# Patient Record
Sex: MALE | URBAN - METROPOLITAN AREA
[De-identification: names, ages, dates, MRNs, and addresses within clinical notes are randomized per-mention and may not be internally consistent; named-entity substitution may affect disease eponyms.]

---

## 2022-12-27 ENCOUNTER — OFFICE (OUTPATIENT)
Dept: URBAN - METROPOLITAN AREA CLINIC 6 | Facility: CLINIC | Age: 77
Setting detail: OPHTHALMOLOGY
End: 2022-12-27
Payer: COMMERCIAL

## 2022-12-27 DIAGNOSIS — E11.3293: ICD-10-CM

## 2022-12-27 DIAGNOSIS — H25.12: ICD-10-CM

## 2022-12-27 DIAGNOSIS — H52.4: ICD-10-CM

## 2022-12-27 DIAGNOSIS — H02.403: ICD-10-CM

## 2022-12-27 PROCEDURE — 92004 COMPRE OPH EXAM NEW PT 1/>: CPT | Performed by: OPHTHALMOLOGY

## 2022-12-27 PROCEDURE — 92015 DETERMINE REFRACTIVE STATE: CPT | Performed by: OPHTHALMOLOGY

## 2022-12-27 ASSESSMENT — KERATOMETRY
OS_AXISANGLE_DEGREES: 025
OD_K2POWER_DIOPTERS: 46.00
OS_K2POWER_DIOPTERS: 45.75
OD_K1POWER_DIOPTERS: 45.25
METHOD_AUTO_MANUAL: AUTO
OD_AXISANGLE_DEGREES: 155
OS_K1POWER_DIOPTERS: 45.00

## 2022-12-27 ASSESSMENT — REFRACTION_MANIFEST
OD_AXIS: 080
OS_SPHERE: +1.25
OD_VA1: 20/20
OS_AXIS: 115
OS_AXIS: 115
OS_CYLINDER: -1.75
OU_VA: 20/30-2
OS_CYLINDER: -1.75
OD_SPHERE: +0.75
OD_CYLINDER: -1.25
OD_CYLINDER: -1.25
OD_AXIS: 080
OS_VA1: 20/40
OS_VA1: 20/40
OD_ADD: +2.50
OD_SPHERE: +0.75
OS_ADD: +2.50
OU_VA: 20/30-2
OS_SPHERE: +1.25
OD_VA1: 20/20

## 2022-12-27 ASSESSMENT — REFRACTION_AUTOREFRACTION
OD_CYLINDER: -1.25
OD_SPHERE: +0.75
OS_AXIS: 115
OD_AXIS: 080
OS_SPHERE: +1.25
OS_CYLINDER: -1.75

## 2022-12-27 ASSESSMENT — AXIALLENGTH_DERIVED
OD_AL: 22.7908
OS_AL: 22.7856
OD_AL: 22.7908
OS_AL: 22.7856
OS_AL: 22.7856
OD_AL: 22.7908

## 2022-12-27 ASSESSMENT — CONFRONTATIONAL VISUAL FIELD TEST (CVF)
OS_FINDINGS: FULL
OD_FINDINGS: FULL

## 2022-12-27 ASSESSMENT — SPHEQUIV_DERIVED
OD_SPHEQUIV: 0.125
OD_SPHEQUIV: 0.125
OS_SPHEQUIV: 0.375
OD_SPHEQUIV: 0.125

## 2022-12-27 ASSESSMENT — LID POSITION - PTOSIS
OS_PTOSIS: T 1+
OD_PTOSIS: T 1+

## 2022-12-27 ASSESSMENT — TONOMETRY
OS_IOP_MMHG: 11
OD_IOP_MMHG: 14

## 2022-12-27 ASSESSMENT — VISUAL ACUITY
OS_BCVA: 20/25-2
OD_BCVA: 20/50-1

## 2023-10-15 ENCOUNTER — INPATIENT (INPATIENT)
Facility: HOSPITAL | Age: 78
LOS: 0 days | Discharge: LEFT BEFORE DISCHARGE | DRG: 65 | End: 2023-10-16
Attending: STUDENT IN AN ORGANIZED HEALTH CARE EDUCATION/TRAINING PROGRAM | Admitting: HOSPITALIST
Payer: MEDICARE

## 2023-10-15 VITALS
HEART RATE: 50 BPM | HEIGHT: 66 IN | SYSTOLIC BLOOD PRESSURE: 120 MMHG | DIASTOLIC BLOOD PRESSURE: 56 MMHG | RESPIRATION RATE: 18 BRPM | TEMPERATURE: 98 F | OXYGEN SATURATION: 96 %

## 2023-10-15 DIAGNOSIS — R47.01 APHASIA: ICD-10-CM

## 2023-10-15 LAB
ALBUMIN SERPL ELPH-MCNC: 4.4 G/DL — SIGNIFICANT CHANGE UP (ref 3.3–5.2)
ALP SERPL-CCNC: 58 U/L — SIGNIFICANT CHANGE UP (ref 40–120)
ALT FLD-CCNC: 18 U/L — SIGNIFICANT CHANGE UP
ANION GAP SERPL CALC-SCNC: 11 MMOL/L — SIGNIFICANT CHANGE UP (ref 5–17)
APPEARANCE UR: CLEAR — SIGNIFICANT CHANGE UP
APTT BLD: 37.6 SEC — HIGH (ref 24.5–35.6)
AST SERPL-CCNC: 28 U/L — SIGNIFICANT CHANGE UP
BASOPHILS # BLD AUTO: 0.02 K/UL — SIGNIFICANT CHANGE UP (ref 0–0.2)
BASOPHILS NFR BLD AUTO: 0.4 % — SIGNIFICANT CHANGE UP (ref 0–2)
BILIRUB SERPL-MCNC: 0.3 MG/DL — LOW (ref 0.4–2)
BILIRUB UR-MCNC: NEGATIVE — SIGNIFICANT CHANGE UP
BUN SERPL-MCNC: 37.7 MG/DL — HIGH (ref 8–20)
CALCIUM SERPL-MCNC: 9.6 MG/DL — SIGNIFICANT CHANGE UP (ref 8.4–10.5)
CHLORIDE SERPL-SCNC: 102 MMOL/L — SIGNIFICANT CHANGE UP (ref 96–108)
CO2 SERPL-SCNC: 24 MMOL/L — SIGNIFICANT CHANGE UP (ref 22–29)
COLOR SPEC: YELLOW — SIGNIFICANT CHANGE UP
CREAT SERPL-MCNC: 2.29 MG/DL — HIGH (ref 0.5–1.3)
DIFF PNL FLD: NEGATIVE — SIGNIFICANT CHANGE UP
EGFR: 29 ML/MIN/1.73M2 — LOW
EOSINOPHIL # BLD AUTO: 0.34 K/UL — SIGNIFICANT CHANGE UP (ref 0–0.5)
EOSINOPHIL NFR BLD AUTO: 7 % — HIGH (ref 0–6)
GLUCOSE BLDC GLUCOMTR-MCNC: 94 MG/DL — SIGNIFICANT CHANGE UP (ref 70–99)
GLUCOSE SERPL-MCNC: 113 MG/DL — HIGH (ref 70–99)
GLUCOSE UR QL: NEGATIVE MG/DL — SIGNIFICANT CHANGE UP
HCT VFR BLD CALC: 33.4 % — LOW (ref 39–50)
HGB BLD-MCNC: 10.8 G/DL — LOW (ref 13–17)
IMM GRANULOCYTES NFR BLD AUTO: 0.2 % — SIGNIFICANT CHANGE UP (ref 0–0.9)
INR BLD: 1.14 RATIO — SIGNIFICANT CHANGE UP (ref 0.85–1.18)
KETONES UR-MCNC: NEGATIVE — SIGNIFICANT CHANGE UP
LEUKOCYTE ESTERASE UR-ACNC: NEGATIVE — SIGNIFICANT CHANGE UP
LYMPHOCYTES # BLD AUTO: 1.21 K/UL — SIGNIFICANT CHANGE UP (ref 1–3.3)
LYMPHOCYTES # BLD AUTO: 24.7 % — SIGNIFICANT CHANGE UP (ref 13–44)
MCHC RBC-ENTMCNC: 31.1 PG — SIGNIFICANT CHANGE UP (ref 27–34)
MCHC RBC-ENTMCNC: 32.3 GM/DL — SIGNIFICANT CHANGE UP (ref 32–36)
MCV RBC AUTO: 96.3 FL — SIGNIFICANT CHANGE UP (ref 80–100)
MONOCYTES # BLD AUTO: 0.61 K/UL — SIGNIFICANT CHANGE UP (ref 0–0.9)
MONOCYTES NFR BLD AUTO: 12.5 % — SIGNIFICANT CHANGE UP (ref 2–14)
NEUTROPHILS # BLD AUTO: 2.7 K/UL — SIGNIFICANT CHANGE UP (ref 1.8–7.4)
NEUTROPHILS NFR BLD AUTO: 55.2 % — SIGNIFICANT CHANGE UP (ref 43–77)
NITRITE UR-MCNC: NEGATIVE — SIGNIFICANT CHANGE UP
PH UR: 6.5 — SIGNIFICANT CHANGE UP (ref 5–8)
PLATELET # BLD AUTO: 163 K/UL — SIGNIFICANT CHANGE UP (ref 150–400)
POTASSIUM SERPL-MCNC: 5.6 MMOL/L — HIGH (ref 3.5–5.3)
POTASSIUM SERPL-SCNC: 5.6 MMOL/L — HIGH (ref 3.5–5.3)
PROT SERPL-MCNC: 8.2 G/DL — SIGNIFICANT CHANGE UP (ref 6.6–8.7)
PROT UR-MCNC: NEGATIVE — SIGNIFICANT CHANGE UP
PROTHROM AB SERPL-ACNC: 12.6 SEC — SIGNIFICANT CHANGE UP (ref 9.5–13)
RBC # BLD: 3.47 M/UL — LOW (ref 4.2–5.8)
RBC # FLD: 13.8 % — SIGNIFICANT CHANGE UP (ref 10.3–14.5)
SODIUM SERPL-SCNC: 137 MMOL/L — SIGNIFICANT CHANGE UP (ref 135–145)
SP GR SPEC: 1.01 — SIGNIFICANT CHANGE UP (ref 1.01–1.02)
UROBILINOGEN FLD QL: NEGATIVE MG/DL — SIGNIFICANT CHANGE UP
WBC # BLD: 4.89 K/UL — SIGNIFICANT CHANGE UP (ref 3.8–10.5)
WBC # FLD AUTO: 4.89 K/UL — SIGNIFICANT CHANGE UP (ref 3.8–10.5)

## 2023-10-15 PROCEDURE — 70450 CT HEAD/BRAIN W/O DYE: CPT | Mod: 26,MA

## 2023-10-15 PROCEDURE — 93880 EXTRACRANIAL BILAT STUDY: CPT | Mod: 26

## 2023-10-15 PROCEDURE — 99222 1ST HOSP IP/OBS MODERATE 55: CPT

## 2023-10-15 PROCEDURE — 99285 EMERGENCY DEPT VISIT HI MDM: CPT

## 2023-10-15 RX ORDER — DEXTROSE 50 % IN WATER 50 %
25 SYRINGE (ML) INTRAVENOUS ONCE
Refills: 0 | Status: DISCONTINUED | OUTPATIENT
Start: 2023-10-15 | End: 2023-10-16

## 2023-10-15 RX ORDER — SODIUM CHLORIDE 9 MG/ML
3 INJECTION INTRAMUSCULAR; INTRAVENOUS; SUBCUTANEOUS EVERY 8 HOURS
Refills: 0 | Status: DISCONTINUED | OUTPATIENT
Start: 2023-10-15 | End: 2023-10-16

## 2023-10-15 RX ORDER — GLUCAGON INJECTION, SOLUTION 0.5 MG/.1ML
1 INJECTION, SOLUTION SUBCUTANEOUS ONCE
Refills: 0 | Status: DISCONTINUED | OUTPATIENT
Start: 2023-10-15 | End: 2023-10-16

## 2023-10-15 RX ORDER — HEPARIN SODIUM 5000 [USP'U]/ML
5000 INJECTION INTRAVENOUS; SUBCUTANEOUS EVERY 12 HOURS
Refills: 0 | Status: DISCONTINUED | OUTPATIENT
Start: 2023-10-15 | End: 2023-10-16

## 2023-10-15 RX ORDER — ONDANSETRON 8 MG/1
4 TABLET, FILM COATED ORAL EVERY 8 HOURS
Refills: 0 | Status: DISCONTINUED | OUTPATIENT
Start: 2023-10-15 | End: 2023-10-16

## 2023-10-15 RX ORDER — DEXTROSE 50 % IN WATER 50 %
15 SYRINGE (ML) INTRAVENOUS ONCE
Refills: 0 | Status: DISCONTINUED | OUTPATIENT
Start: 2023-10-15 | End: 2023-10-16

## 2023-10-15 RX ORDER — ACETAMINOPHEN 500 MG
650 TABLET ORAL EVERY 6 HOURS
Refills: 0 | Status: DISCONTINUED | OUTPATIENT
Start: 2023-10-15 | End: 2023-10-16

## 2023-10-15 RX ORDER — DEXTROSE 50 % IN WATER 50 %
12.5 SYRINGE (ML) INTRAVENOUS ONCE
Refills: 0 | Status: DISCONTINUED | OUTPATIENT
Start: 2023-10-15 | End: 2023-10-16

## 2023-10-15 RX ORDER — SODIUM CHLORIDE 9 MG/ML
1000 INJECTION, SOLUTION INTRAVENOUS
Refills: 0 | Status: DISCONTINUED | OUTPATIENT
Start: 2023-10-15 | End: 2023-10-16

## 2023-10-15 RX ORDER — ASPIRIN/CALCIUM CARB/MAGNESIUM 324 MG
300 TABLET ORAL ONCE
Refills: 0 | Status: COMPLETED | OUTPATIENT
Start: 2023-10-15 | End: 2023-10-15

## 2023-10-15 RX ORDER — LANOLIN ALCOHOL/MO/W.PET/CERES
3 CREAM (GRAM) TOPICAL AT BEDTIME
Refills: 0 | Status: DISCONTINUED | OUTPATIENT
Start: 2023-10-15 | End: 2023-10-16

## 2023-10-15 RX ORDER — ASPIRIN/CALCIUM CARB/MAGNESIUM 324 MG
325 TABLET ORAL DAILY
Refills: 0 | Status: DISCONTINUED | OUTPATIENT
Start: 2023-10-15 | End: 2023-10-15

## 2023-10-15 RX ORDER — ATORVASTATIN CALCIUM 80 MG/1
80 TABLET, FILM COATED ORAL AT BEDTIME
Refills: 0 | Status: DISCONTINUED | OUTPATIENT
Start: 2023-10-15 | End: 2023-10-16

## 2023-10-15 RX ORDER — INSULIN LISPRO 100/ML
VIAL (ML) SUBCUTANEOUS EVERY 6 HOURS
Refills: 0 | Status: DISCONTINUED | OUTPATIENT
Start: 2023-10-15 | End: 2023-10-16

## 2023-10-15 RX ADMIN — SODIUM CHLORIDE 3 MILLILITER(S): 9 INJECTION INTRAMUSCULAR; INTRAVENOUS; SUBCUTANEOUS at 23:01

## 2023-10-15 NOTE — ED PROVIDER NOTE - CONSTITUTIONAL NEGATIVE STATEMENT, MLM
Patient Weight (Optional But Required For Cumulative Dose-Numbers And Decimals Only): 73 no fever and no chills.

## 2023-10-15 NOTE — ED ADULT TRIAGE NOTE - CHIEF COMPLAINT QUOTE
dgtr states father fell 1 hour ago, but 3 days ago pt has been having difficulty speaking and left sided weakness, unable to walk, difficulty hearing & not making sense  Awake alert, confused, hX Dementia Hx stroke, not making sense, was given procrit, states eyes are yellowish dgtr states father fell 1 hour ago, but 3 days ago pt has been having difficulty speaking and left sided weakness, unable to walk, difficulty hearing & not making sense  Awake alert, confused, hX Dementia Hx stroke, not making sense, was given procrit, states eyes are yellowish, stage 4 CKD

## 2023-10-15 NOTE — H&P ADULT - ASSESSMENT
76 y/o male with likely acute on chronic CVA, Hx of CKD-4, HTN, HLD, DM-2, Hypothyroidism, Neuropathy     Acute on chronic CVA:  -Unclear when LKW was, but per Daughter symptoms ongoing for past 3 days  -NIH at this time 5, unclear true baseline  -CT head as above  -Admit to tele with Stroke order set  -Failed dysphagia screen, SLS ordered  -Aspiration precautions  -Rectal Aspirin   -VS/Neuro checks Q 4 hours  -Check A1c/FLP  -MR brain  -Carotid US  -Neurology eval  -DVT-P with sub q heparin, VC boots    CKD-4:  -    HTN:    HLD:    DM-2:    Hypothyroidism:    Neuropathy:    Discussed plan with Daughter at bedside in detail, and ED staff 78 y/o male with likely acute on chronic CVA, Hx of CKD-4, HTN, HLD, DM-2, Hypothyroidism, Neuropathy     Acute on chronic CVA:  -Unclear when LKW was, but per Daughter symptoms ongoing for past 3 days  -NIH at this time 5, unclear true baseline  -CT head as above  -Admit to tele with Stroke order set  -Failed dysphagia screen, SLS ordered  -Aspiration precautions  -Rectal Aspirin while NPO   -VS/Neuro checks Q 4 hours  -Check A1c/FLP  -MR brain  -Carotid US  -Neurology eval  -DVT-P with sub q heparin, VC boots    CKD-4:  -Cr. at baseline per daughter  -Avoid nephrotoxic agents  -renally dose meds    HTN:  -Unable to resume po regimen with NPO status  -PRN IV regimen for now  -Symptoms >48 hours so no need for permissive HTN    HLD:  -Check FLP  -Resume statin when no longer npo    DM-2:  -Q 6 hour accu checks while NPO  -Coverage scale  -A1c    Hypothyroidism:  -Change Synthroid to IV at 50% of PO dose while NPO    Neuropathy:  -Resume Neurontin when no longer NPO    Discussed plan with Daughter at bedside in detail, and ED staff

## 2023-10-15 NOTE — ED ADULT NURSE NOTE - CHIEF COMPLAINT QUOTE
dgtr states father fell 1 hour ago, but 3 days ago pt has been having difficulty speaking and left sided weakness, unable to walk, difficulty hearing & not making sense  Awake alert, confused, hX Dementia Hx stroke, not making sense, was given procrit, states eyes are yellowish, stage 4 CKD

## 2023-10-15 NOTE — ED ADULT NURSE NOTE - NSFALLRISKINTERV_ED_ALL_ED
Assistance OOB with selected safe patient handling equipment if applicable/Assistance with ambulation/Communicate fall risk and risk factors to all staff, patient, and family/Monitor gait and stability/Monitor for mental status changes and reorient to person, place, and time, as needed/Move patient closer to nursing station/within visual sight of ED staff/Provide patient with walking aids/Provide visual cue: yellow wristband, yellow gown, etc/Reinforce activity limits and safety measures with patient and family/Toileting schedule using arm’s reach rule for commode and bathroom/Use of alarms - bed, stretcher, chair and/or video monitoring/Call bell, personal items and telephone in reach/Instruct patient to call for assistance before getting out of bed/chair/stretcher/Non-slip footwear applied when patient is off stretcher/[place] to call system/Physically safe environment - no spills, clutter or unnecessary equipment/Purposeful Proactive Rounding/Room/bathroom lighting operational, light cord in reach

## 2023-10-15 NOTE — ED ADULT NURSE NOTE - RELATIONSHIP TO PATIENT
patient presents with concern for COVID exposure.  As patient is nontoxic appearing will test for COVID and d/c.  Quarantine reviewed and return precautions reviewed. daughter/full time care giver

## 2023-10-15 NOTE — H&P ADULT - HISTORY OF PRESENT ILLNESS
76 y/o male with hx of DM-2, HTN, HLD, Neuropathy, CKD-4, hypothyroidism, remote CVA with left sided weakness brought in by Daughter for increasing left sided weakness from baseline, and slurred speech as well as fall prior to coming to the ED. No reported LOC or head strike, no c/o neck pain. LKW around 3 days ago, but Daughter also states he had left leg weakness a month ago and fell but she did not bring him in for an evaluation. Denies visual changes, HA, N/V, SOB, or CP or fever. In ED Patient with no acute findings on head CT, vitals with SB, but normal BP. Noted to have left sided weakness, and failed dysphagia screen. Daughter at bedside for collateral information. Cr. at baseline of 2.3, mild hyperkalemia at 5.6, UA neg. Patient to be admitted for acute on chronic CVA w/u.

## 2023-10-15 NOTE — ED ADULT NURSE NOTE - OBJECTIVE STATEMENT
78 y/o male presents to ED s/p fall approx.. 2 hours ago and difficulty speaking for 3 days. as per daughter, she witness pt fall today "they were trying to walk from bed to bathroom", denies headstike, LOC, anticoagulant use. unsure if pt fell 3 days ago "he would not remember if you ask" daughter states. pt states over r the past few weeks pt has been having increased difficulty walking "I had to order a wheelchair". diagnosed with ckd stage 4 in august 2023, daughter states she has been restricting pt's diet ans seen a nutritionist. daughter endorses fear if pt will need dialysis.

## 2023-10-15 NOTE — H&P ADULT - NSICDXPASTMEDICALHX_GEN_ALL_CORE_FT
PAST MEDICAL HISTORY:  CVA (cerebrovascular accident)     Dementia     Diabetes     HLD (hyperlipidemia)     HTN (hypertension)     Hypothyroidism     Neuropathy

## 2023-10-15 NOTE — ED ADULT NURSE REASSESSMENT NOTE - NS ED NURSE REASSESS COMMENT FT1
assumed care of pt from previous RN Jocelyn. Pt breathing even and unlabored at this time, no acute distress noted. Pt resting comfortably, awaiting admission. Pt able to make needs known, has no complaints at this time. Daughter at bedside

## 2023-10-15 NOTE — ED PROVIDER NOTE - OBJECTIVE STATEMENT
77 year old male with PMH CKD4, anemia, dementia, CVA presents with difficulty speaking. As per daughter for the past 3 days the pt has had difficulty with getting words out. She also reports difficulty with ambulation due to L sided weakness. Sx are constant, no alleviating/exacerbating factors. No associated fevers, chills, chest pain, SOB.

## 2023-10-16 VITALS
OXYGEN SATURATION: 92 % | SYSTOLIC BLOOD PRESSURE: 147 MMHG | RESPIRATION RATE: 18 BRPM | HEART RATE: 54 BPM | DIASTOLIC BLOOD PRESSURE: 66 MMHG | TEMPERATURE: 98 F

## 2023-10-16 LAB
A1C WITH ESTIMATED AVERAGE GLUCOSE RESULT: 5.8 % — HIGH (ref 4–5.6)
ANION GAP SERPL CALC-SCNC: 11 MMOL/L — SIGNIFICANT CHANGE UP (ref 5–17)
BUN SERPL-MCNC: 33.9 MG/DL — HIGH (ref 8–20)
CALCIUM SERPL-MCNC: 9.4 MG/DL — SIGNIFICANT CHANGE UP (ref 8.4–10.5)
CHLORIDE SERPL-SCNC: 105 MMOL/L — SIGNIFICANT CHANGE UP (ref 96–108)
CHOLEST SERPL-MCNC: 124 MG/DL — SIGNIFICANT CHANGE UP
CO2 SERPL-SCNC: 24 MMOL/L — SIGNIFICANT CHANGE UP (ref 22–29)
CREAT SERPL-MCNC: 1.96 MG/DL — HIGH (ref 0.5–1.3)
EGFR: 35 ML/MIN/1.73M2 — LOW
ESTIMATED AVERAGE GLUCOSE: 120 MG/DL — HIGH (ref 68–114)
GLUCOSE BLDC GLUCOMTR-MCNC: 125 MG/DL — HIGH (ref 70–99)
GLUCOSE BLDC GLUCOMTR-MCNC: 126 MG/DL — HIGH (ref 70–99)
GLUCOSE BLDC GLUCOMTR-MCNC: 127 MG/DL — HIGH (ref 70–99)
GLUCOSE BLDC GLUCOMTR-MCNC: 127 MG/DL — HIGH (ref 70–99)
GLUCOSE SERPL-MCNC: 87 MG/DL — SIGNIFICANT CHANGE UP (ref 70–99)
HCT VFR BLD CALC: 33.1 % — LOW (ref 39–50)
HCV AB S/CO SERPL IA: 0.06 S/CO — SIGNIFICANT CHANGE UP (ref 0–0.99)
HCV AB SERPL-IMP: SIGNIFICANT CHANGE UP
HDLC SERPL-MCNC: 46 MG/DL — SIGNIFICANT CHANGE UP
HGB BLD-MCNC: 10.3 G/DL — LOW (ref 13–17)
LIPID PNL WITH DIRECT LDL SERPL: 62 MG/DL — SIGNIFICANT CHANGE UP
MAGNESIUM SERPL-MCNC: 1.8 MG/DL — SIGNIFICANT CHANGE UP (ref 1.6–2.6)
MCHC RBC-ENTMCNC: 29.9 PG — SIGNIFICANT CHANGE UP (ref 27–34)
MCHC RBC-ENTMCNC: 31.1 GM/DL — LOW (ref 32–36)
MCV RBC AUTO: 96.2 FL — SIGNIFICANT CHANGE UP (ref 80–100)
NON HDL CHOLESTEROL: 78 MG/DL — SIGNIFICANT CHANGE UP
PHOSPHATE SERPL-MCNC: 3.4 MG/DL — SIGNIFICANT CHANGE UP (ref 2.4–4.7)
PLATELET # BLD AUTO: 167 K/UL — SIGNIFICANT CHANGE UP (ref 150–400)
POTASSIUM SERPL-MCNC: 4.1 MMOL/L — SIGNIFICANT CHANGE UP (ref 3.5–5.3)
POTASSIUM SERPL-SCNC: 4.1 MMOL/L — SIGNIFICANT CHANGE UP (ref 3.5–5.3)
RBC # BLD: 3.44 M/UL — LOW (ref 4.2–5.8)
RBC # FLD: 13.6 % — SIGNIFICANT CHANGE UP (ref 10.3–14.5)
SODIUM SERPL-SCNC: 140 MMOL/L — SIGNIFICANT CHANGE UP (ref 135–145)
TRIGL SERPL-MCNC: 82 MG/DL — SIGNIFICANT CHANGE UP
WBC # BLD: 5.89 K/UL — SIGNIFICANT CHANGE UP (ref 3.8–10.5)
WBC # FLD AUTO: 5.89 K/UL — SIGNIFICANT CHANGE UP (ref 3.8–10.5)

## 2023-10-16 PROCEDURE — 93005 ELECTROCARDIOGRAM TRACING: CPT

## 2023-10-16 PROCEDURE — 93880 EXTRACRANIAL BILAT STUDY: CPT

## 2023-10-16 PROCEDURE — 86803 HEPATITIS C AB TEST: CPT

## 2023-10-16 PROCEDURE — 80048 BASIC METABOLIC PNL TOTAL CA: CPT

## 2023-10-16 PROCEDURE — 85730 THROMBOPLASTIN TIME PARTIAL: CPT

## 2023-10-16 PROCEDURE — C8929: CPT

## 2023-10-16 PROCEDURE — 83735 ASSAY OF MAGNESIUM: CPT

## 2023-10-16 PROCEDURE — 84100 ASSAY OF PHOSPHORUS: CPT

## 2023-10-16 PROCEDURE — 93306 TTE W/DOPPLER COMPLETE: CPT | Mod: 26

## 2023-10-16 PROCEDURE — 70450 CT HEAD/BRAIN W/O DYE: CPT | Mod: MA

## 2023-10-16 PROCEDURE — 99285 EMERGENCY DEPT VISIT HI MDM: CPT | Mod: 25

## 2023-10-16 PROCEDURE — 80061 LIPID PANEL: CPT

## 2023-10-16 PROCEDURE — 92610 EVALUATE SWALLOWING FUNCTION: CPT

## 2023-10-16 PROCEDURE — 36415 COLL VENOUS BLD VENIPUNCTURE: CPT

## 2023-10-16 PROCEDURE — 93010 ELECTROCARDIOGRAM REPORT: CPT

## 2023-10-16 PROCEDURE — 83036 HEMOGLOBIN GLYCOSYLATED A1C: CPT

## 2023-10-16 PROCEDURE — 82962 GLUCOSE BLOOD TEST: CPT

## 2023-10-16 PROCEDURE — 85027 COMPLETE CBC AUTOMATED: CPT

## 2023-10-16 PROCEDURE — 80053 COMPREHEN METABOLIC PANEL: CPT

## 2023-10-16 PROCEDURE — 85610 PROTHROMBIN TIME: CPT

## 2023-10-16 PROCEDURE — 85025 COMPLETE CBC W/AUTO DIFF WBC: CPT

## 2023-10-16 PROCEDURE — 99232 SBSQ HOSP IP/OBS MODERATE 35: CPT

## 2023-10-16 PROCEDURE — 81003 URINALYSIS AUTO W/O SCOPE: CPT

## 2023-10-16 RX ORDER — SITAGLIPTIN 50 MG/1
1 TABLET, FILM COATED ORAL
Refills: 0 | DISCHARGE

## 2023-10-16 RX ORDER — CALCITRIOL 0.5 UG/1
1 CAPSULE ORAL
Refills: 0 | DISCHARGE

## 2023-10-16 RX ORDER — LOSARTAN POTASSIUM 100 MG/1
1 TABLET, FILM COATED ORAL
Refills: 0 | DISCHARGE

## 2023-10-16 RX ORDER — GABAPENTIN 400 MG/1
1 CAPSULE ORAL
Refills: 0 | DISCHARGE

## 2023-10-16 RX ORDER — ATORVASTATIN CALCIUM 80 MG/1
1 TABLET, FILM COATED ORAL
Refills: 0 | DISCHARGE

## 2023-10-16 RX ORDER — FENOFIBRATE,MICRONIZED 130 MG
1 CAPSULE ORAL
Refills: 0 | DISCHARGE

## 2023-10-16 RX ORDER — DONEPEZIL HYDROCHLORIDE 10 MG/1
1 TABLET, FILM COATED ORAL
Refills: 0 | DISCHARGE

## 2023-10-16 RX ORDER — ASPIRIN/CALCIUM CARB/MAGNESIUM 324 MG
1 TABLET ORAL
Refills: 0 | DISCHARGE

## 2023-10-16 RX ORDER — GABAPENTIN 400 MG/1
100 CAPSULE ORAL DAILY
Refills: 0 | Status: DISCONTINUED | OUTPATIENT
Start: 2023-10-16 | End: 2023-10-16

## 2023-10-16 RX ORDER — LORATADINE 10 MG/1
1 TABLET ORAL
Refills: 0 | DISCHARGE

## 2023-10-16 RX ORDER — ASPIRIN/CALCIUM CARB/MAGNESIUM 324 MG
81 TABLET ORAL DAILY
Refills: 0 | Status: DISCONTINUED | OUTPATIENT
Start: 2023-10-16 | End: 2023-10-16

## 2023-10-16 RX ORDER — PIOGLITAZONE HYDROCHLORIDE 15 MG/1
1 TABLET ORAL
Refills: 0 | DISCHARGE

## 2023-10-16 RX ORDER — LEVOTHYROXINE SODIUM 125 MCG
150 TABLET ORAL DAILY
Refills: 0 | Status: DISCONTINUED | OUTPATIENT
Start: 2023-10-16 | End: 2023-10-16

## 2023-10-16 RX ORDER — TAMSULOSIN HYDROCHLORIDE 0.4 MG/1
1 CAPSULE ORAL
Refills: 0 | DISCHARGE

## 2023-10-16 RX ORDER — LEVOTHYROXINE SODIUM 125 MCG
1 TABLET ORAL
Refills: 0 | DISCHARGE

## 2023-10-16 RX ORDER — LEVOTHYROXINE SODIUM 125 MCG
75 TABLET ORAL AT BEDTIME
Refills: 0 | Status: DISCONTINUED | OUTPATIENT
Start: 2023-10-16 | End: 2023-10-16

## 2023-10-16 RX ORDER — LOSARTAN POTASSIUM 100 MG/1
50 TABLET, FILM COATED ORAL DAILY
Refills: 0 | Status: DISCONTINUED | OUTPATIENT
Start: 2023-10-16 | End: 2023-10-16

## 2023-10-16 RX ADMIN — Medication 150 MICROGRAM(S): at 12:10

## 2023-10-16 RX ADMIN — LOSARTAN POTASSIUM 50 MILLIGRAM(S): 100 TABLET, FILM COATED ORAL at 12:10

## 2023-10-16 RX ADMIN — HEPARIN SODIUM 5000 UNIT(S): 5000 INJECTION INTRAVENOUS; SUBCUTANEOUS at 05:52

## 2023-10-16 RX ADMIN — GABAPENTIN 100 MILLIGRAM(S): 400 CAPSULE ORAL at 12:10

## 2023-10-16 RX ADMIN — SODIUM CHLORIDE 3 MILLILITER(S): 9 INJECTION INTRAMUSCULAR; INTRAVENOUS; SUBCUTANEOUS at 15:00

## 2023-10-16 RX ADMIN — SODIUM CHLORIDE 3 MILLILITER(S): 9 INJECTION INTRAMUSCULAR; INTRAVENOUS; SUBCUTANEOUS at 05:52

## 2023-10-16 RX ADMIN — Medication 81 MILLIGRAM(S): at 12:10

## 2023-10-16 NOTE — SWALLOW BEDSIDE ASSESSMENT ADULT - SWALLOW EVAL: DIAGNOSIS
Oral and pharyngeal stages of swallow appear functional for easy-to-chew solids and thin liquids. Oral stage of swallow negatively impacted by edentulous state, with improved mastication with easy-to-chew solids

## 2023-10-16 NOTE — PHYSICAL THERAPY INITIAL EVALUATION ADULT - PERTINENT HX OF CURRENT PROBLEM, REHAB EVAL
Pt is 78 y/o male with likely acute on chronic CVA, Hx of CKD-4, HTN, HLD, DM-2, Hypothyroidism, Neuropathy, present with weakness.

## 2023-10-16 NOTE — SWALLOW BEDSIDE ASSESSMENT ADULT - SLP GENERAL OBSERVATIONS
Pt received from PT, seated at edge of bed, A&A, 0x3, English speaking, cooperative and pleasant, tolerating room air, ?mild dysarthria(?baseline), pain 0/10 pre/post eval

## 2023-10-16 NOTE — PHYSICAL THERAPY INITIAL EVALUATION ADULT - ADDITIONAL COMMENTS
Pt reports independent with [facility], owns no other DME. Independent ADLs, daughter can assist if needed.

## 2023-10-16 NOTE — PROGRESS NOTE ADULT - ASSESSMENT
78 y/o male with likely acute on chronic CVA, Hx of CKD-4, HTN, HLD, DM-2, Hypothyroidism, Neuropathy     Acute on chronic CVA  -Unclear when LKW was, but per Daughter symptoms ongoing for past 3 days  -NIH at this time 5, unclear true baseline  -CT head negative  - Carotid US negative  - Aspirin 81mg daily  - Lipitor 80mg nightly  - MRI Brain pending  - PT consult  - Neurology recs appreciated    CKD-4  - Monitor BMP    HTN  - Losartan 50mg daily    HLD  - Lipitor increased to 80mg nightly    DM-2  - FS with ISS    Hypothyroidism  - Synthroid 150mcg daily    Neuropathy  - Neurontin 100mg daily    DVT ppx  - Heparin SQ

## 2023-10-16 NOTE — PROGRESS NOTE ADULT - SUBJECTIVE AND OBJECTIVE BOX
Chief complaint: CVA    Patient seen and examined at bedside. No acute overnight events reported. Patient states he is well just weak. No fever, chills, cough, nausea or vomiting.     Vital Signs Last 24 Hrs  T(F): 98.6 (16 Oct 2023 07:20), Max: 98.8 (16 Oct 2023 00:18)  HR: 64 (16 Oct 2023 07:20) (50 - 78)  BP: 159/67 (16 Oct 2023 07:20) (120/56 - 159/67)  RR: 18 (16 Oct 2023 07:20) (16 - 18)  SpO2: 98% (16 Oct 2023 07:20) (95% - 98%)    Physical Exam:  Constitutional: alert and oriented, in no acute distress   Neck: Soft and supple  Respiratory: Clear to auscultation bilaterally, no wheezes or crackles  Cardiovascular: Regular rate and rhythm  Gastrointestinal: Soft, non-tender to palpation, +bs  Vascular: 2+ peripheral pulses  Neurological: A/O x 3  Musculoskeletal: 5/5 strength b/l upper and lower extremities, no lower extremity edema bilaterally    Labs:                        10.3   5.89  )-----------( 167      ( 16 Oct 2023 02:21 )             33.1   10-16    140  |  105  |  33.9<H>  ----------------------------<  87  4.1   |  24.0  |  1.96<H>    Ca    9.4      16 Oct 2023 02:21  Phos  3.4     10-16  Mg     1.8     10-16    TPro  8.2  /  Alb  4.4  /  TBili  0.3<L>  /  DBili  x   /  AST  28  /  ALT  18  /  AlkPhos  58  10-15

## 2023-10-16 NOTE — ED ADULT NURSE REASSESSMENT NOTE - NS ED NURSE REASSESS COMMENT FT1
Report given to CDU RN TS. Pt breathing even and unlabored, no acute distress noted. Pt able to make needs known. Pt to be moved to CDU 6R.

## 2023-10-16 NOTE — CHART NOTE - NSCHARTNOTEFT_GEN_A_CORE
Was called by RN due to patient and patient's daughter wanting to sign patient out AMA.     Patient is AAO x 3. I explained at length to the patient and his daughter the risks of signing out AMA including but not limited to harm, injury or death. I offered to answer any questions and fully answered any such questions. I believe that the patient and his daughter fully understands what has been explained and answered. I informed hospitalist Dr. Laird of this, aware. Patient signed form to sign out AMA and accepts responsibility for any and all results of this decision.

## 2023-10-16 NOTE — PATIENT PROFILE ADULT - FALL HARM RISK - HARM RISK INTERVENTIONS
Assistance with ambulation/Assistance OOB with selected safe patient handling equipment/Communicate Risk of Fall with Harm to all staff/Discuss with provider need for PT consult/Monitor gait and stability/Provide patient with walking aids - walker, cane, crutches/Reinforce activity limits and safety measures with patient and family/Tailored Fall Risk Interventions/Use of alarms - bed, chair and/or voice tab/Visual Cue: Yellow wristband and red socks/Bed in lowest position, wheels locked, appropriate side rails in place/Call bell, personal items and telephone in reach/Instruct patient to call for assistance before getting out of bed or chair/Non-slip footwear when patient is out of bed/[place] to call system/Physically safe environment - no spills, clutter or unnecessary equipment/Purposeful Proactive Rounding/Room/bathroom lighting operational, light cord in reach

## 2024-01-03 ENCOUNTER — OFFICE (OUTPATIENT)
Dept: URBAN - METROPOLITAN AREA CLINIC 6 | Facility: CLINIC | Age: 79
Setting detail: OPHTHALMOLOGY
End: 2024-01-03
Payer: COMMERCIAL

## 2024-01-03 DIAGNOSIS — H35.371: ICD-10-CM

## 2024-01-03 DIAGNOSIS — H52.7: ICD-10-CM

## 2024-01-03 DIAGNOSIS — Z96.1: ICD-10-CM

## 2024-01-03 DIAGNOSIS — H25.12: ICD-10-CM

## 2024-01-03 DIAGNOSIS — E11.3293: ICD-10-CM

## 2024-01-03 DIAGNOSIS — H02.403: ICD-10-CM

## 2024-01-03 PROCEDURE — 92015 DETERMINE REFRACTIVE STATE: CPT | Performed by: OPHTHALMOLOGY

## 2024-01-03 PROCEDURE — 92014 COMPRE OPH EXAM EST PT 1/>: CPT | Performed by: OPHTHALMOLOGY

## 2024-01-03 PROCEDURE — 92250 FUNDUS PHOTOGRAPHY W/I&R: CPT | Performed by: OPHTHALMOLOGY

## 2024-01-03 ASSESSMENT — LID POSITION - PTOSIS
OS_PTOSIS: T 1+
OD_PTOSIS: T 1+

## 2024-01-03 ASSESSMENT — REFRACTION_CURRENTRX
OD_OVR_VA: 20/
OD_SPHERE: +0.75
OD_ADD: +2.75
OS_OVR_VA: 20/
OD_CYLINDER: -1.25
OS_SPHERE: +1.25
OS_AXIS: 114
OS_CYLINDER: -1.75
OD_AXIS: 85
OS_ADD: +2.75

## 2024-01-03 ASSESSMENT — SPHEQUIV_DERIVED
OD_SPHEQUIV: 0.75
OD_SPHEQUIV: 0.75
OS_SPHEQUIV: 0.375
OS_SPHEQUIV: 0.375
OD_SPHEQUIV: 0.75

## 2024-01-03 ASSESSMENT — REFRACTION_MANIFEST
OS_CYLINDER: -1.75
OD_VA1: 20/30
OS_VA1: 20/40
OD_VA1: 20/30
OU_VA: 20/30-2
OD_ADD: +3.00
OS_VA1: 20/40
OS_SPHERE: +1.25
OD_AXIS: 75
OS_AXIS: 115
OS_SPHERE: +1.25
OD_SPHERE: +1.50
OU_VA: 20/30-2
OS_CYLINDER: -1.75
OS_AXIS: 115
OD_AXIS: 75
OD_CYLINDER: -1.50
OS_ADD: +3.00
OD_CYLINDER: -1.50
OD_SPHERE: +1.50

## 2024-01-03 ASSESSMENT — REFRACTION_AUTOREFRACTION
OS_CYLINDER: -1.75
OD_AXIS: 076
OD_CYLINDER: -1.50
OD_SPHERE: +1.50
OS_SPHERE: ERROR
OS_AXIS: 115

## 2024-01-03 ASSESSMENT — CONFRONTATIONAL VISUAL FIELD TEST (CVF)
OD_FINDINGS: FULL
OS_FINDINGS: FULL